# Patient Record
Sex: MALE | Race: WHITE
[De-identification: names, ages, dates, MRNs, and addresses within clinical notes are randomized per-mention and may not be internally consistent; named-entity substitution may affect disease eponyms.]

---

## 2018-10-22 ENCOUNTER — HOSPITAL ENCOUNTER (EMERGENCY)
Dept: HOSPITAL 97 - ER | Age: 28
Discharge: HOME | End: 2018-10-22
Payer: SELF-PAY

## 2018-10-22 DIAGNOSIS — R31.9: Primary | ICD-10-CM

## 2018-10-22 DIAGNOSIS — Z88.0: ICD-10-CM

## 2018-10-22 DIAGNOSIS — Z88.1: ICD-10-CM

## 2018-10-22 DIAGNOSIS — Z72.0: ICD-10-CM

## 2018-10-22 LAB — UA COMPLETE W REFLEX CULTURE PNL UR: (no result)

## 2018-10-22 PROCEDURE — 76770 US EXAM ABDO BACK WALL COMP: CPT

## 2018-10-22 PROCEDURE — 81015 MICROSCOPIC EXAM OF URINE: CPT

## 2018-10-22 PROCEDURE — 87086 URINE CULTURE/COLONY COUNT: CPT

## 2018-10-22 PROCEDURE — 99283 EMERGENCY DEPT VISIT LOW MDM: CPT

## 2018-10-22 PROCEDURE — 81003 URINALYSIS AUTO W/O SCOPE: CPT

## 2018-10-22 PROCEDURE — 87088 URINE BACTERIA CULTURE: CPT

## 2018-10-22 NOTE — EDPHYS
Physician Documentation                                                                           

 [facility]                                                                

Name: Lico Epstein                                                                             

Age: 28 yrs                                                                                       

Sex: Male                                                                                         

: 1990                                                                                   

MRN: [id number]                                                                                   

Arrival Date: 10/22/2018                                                                          

Time: 10:53                                                                                       

Account#: [id number]                                                                            

Bed 6                                                                                             

Private MD:                                                                                       

ED Physician Kentrell Paredes                                                                      

HPI:                                                                                              

10/22                                                                                             

11:33 This 28 yrs old  Male presents to ER via Ambulatory with complaints of BLOOD   snw 

      IN URINE.                                                                                   

11:33 Associated signs and symptoms: Pertinent negatives: abdominal pain, dysuria, fever.     snw 

      Modifying factors: The patient symptoms are alleviated by nothing. The patient has not      

      experienced similar symptoms in the past. last kidney stone 4 months ago, no pain           

      since, no unprotected intercourse.                                                          

                                                                                                  

Historical:                                                                                       

- Allergies:                                                                                      

11:05 PENICILLINS;                                                                            aa5 

11:05 Amoxicillin;                                                                            aa5 

- Home Meds:                                                                                      

11:05 None [Active];                                                                          aa5 

- PMHx:                                                                                           

11:05 Kidney stones;                                                                          aa5 

- PSHx:                                                                                           

11:05 Tonsillectomy; Adenoids;                                                                aa5 

                                                                                                  

- Immunization history:: Adult Immunizations unknown.                                             

- Social history:: Smoking status: Patient uses tobacco products, denies chronic                  

  smoking, but will smoke occasionally.                                                           

- Ebola Screening: : No symptoms or risks identified at this time.                                

                                                                                                  

                                                                                                  

ROS:                                                                                              

11:33 Constitutional: Negative for fever, chills, and weight loss, Eyes: Negative for injury, snw 

      pain, redness, and discharge, ENT: Negative for injury, pain, and discharge, Neck:          

      Negative for injury, pain, and swelling, Cardiovascular: Negative for chest pain,           

      palpitations, and edema, Respiratory: Negative for shortness of breath, cough,              

      wheezing, and pleuritic chest pain, Abdomen/GI: Negative for abdominal pain, nausea,        

      vomiting, diarrhea, and constipation, Back: Negative for injury and pain, MS/Extremity:     

      Negative for injury and deformity, Skin: Negative for injury, rash, and discoloration,      

      Neuro: Negative for headache, weakness, numbness, tingling, and seizure, Psych:             

      Negative for depression, anxiety, suicide ideation, homicidal ideation, and                 

      hallucinations.                                                                             

11:33 : Positive for hematuria, Negative for injury or acute deformity, urinary symptoms,       

      penile pain, testicular pain                                                                

                                                                                                  

Exam:                                                                                             

11:33 Constitutional:  This is a well developed, well nourished patient who is awake, alert,  snw 

      and in no acute distress. Head/Face:  Normocephalic, atraumatic. Eyes:  Pupils equal        

      round and reactive to light, extra-ocular motions intact.  Lids and lashes normal.          

      Conjunctiva and sclera are non-icteric and not injected.  Cornea within normal limits.      

      Periorbital areas with no swelling, redness, or edema. ENT:  Nares patent. No nasal         

      discharge, no septal abnormalities noted.  Tympanic membranes are normal and external       

      auditory canals are clear.  Oropharynx with no redness, swelling, or masses, exudates,      

      or evidence of obstruction, uvula midline.  Mucous membranes moist. Neck:  Trachea          

      midline, no thyromegaly or masses palpated, and no cervical lymphadenopathy.  Supple,       

      full range of motion without nuchal rigidity, or vertebral point tenderness.  No            

      Meningismus. Chest/axilla:  Normal chest wall appearance and motion.  Nontender with no     

      deformity.  No lesions are appreciated. Cardiovascular:  Regular rate and rhythm with a     

      normal S1 and S2.  No gallops, murmurs, or rubs.  Normal PMI, no JVD.  No pulse             

      deficits. Respiratory:  Lungs have equal breath sounds bilaterally, clear to                

      auscultation and percussion.  No rales, rhonchi or wheezes noted.  No increased work of     

      breathing, no retractions or nasal flaring. Abdomen/GI:  Soft, non-tender, with normal      

      bowel sounds.  No distension or tympany.  No guarding or rebound.  No evidence of           

      tenderness throughout. Back:  No spinal tenderness.  No costovertebral tenderness.          

      Full range of motion. Skin:  Warm, dry with normal turgor.  Normal color with no            

      rashes, no lesions, and no evidence of cellulitis. MS/ Extremity:  Pulses equal, no         

      cyanosis.  Neurovascular intact.  Full, normal range of motion. Neuro:  Awake and           

      alert, GCS 15, oriented to person, place, time, and situation.  Cranial nerves II-XII       

      grossly intact.  Motor strength 5/5 in all extremities.  Sensory grossly intact.            

      Cerebellar exam normal.  Normal gait. Psych:  Awake, alert, with orientation to person,     

      place and time.  Behavior, mood, and affect are within normal limits.                       

                                                                                                  

Vital Signs:                                                                                      

11:05  / 98; Pulse 80; Resp 16 S; Temp 98.4(O); Pulse Ox 100% on R/A; Weight 103.42 kg  aa5 

      (R); Height 6 ft. 4 in. (193.04 cm) (R); Pain 0/10;                                         

11:05 Body Mass Index 27.75 (103.42 kg, 193.04 cm)                                            aa5 

                                                                                                  

MDM:                                                                                              

11:12 Patient medically screened.                                                             [facility] 

11:54 Data reviewed: vital signs, nurses notes. Data interpreted: Pulse oximetry: on room air snw 

      is 100 %. Interpretation: normal. Counseling: I had a detailed discussion with the          

      patient and/or guardian regarding: the historical points, exam findings, and any            

      diagnostic results supporting the discharge/admit diagnosis, the presence of at least       

      one elevated blood pressure reading (>120/80) during this emergency department visit,       

      radiology results, the need for outpatient follow up, to return to the emergency            

      department if symptoms worsen or persist or if there are any questions or concerns that     

      arise at home. Special discussion: Based on the history and exam findings, there is no      

      indication for further emergent testing or inpatient evaluation. I discussed with the       

      patient/guardian the need to see the primary care provider for further evaluation of        

      the symptoms. I discussed with the patient/guardian the need to see the urologist for       

      further evaluation of the symptoms.                                                         

                                                                                                  

10/22                                                                                             

10:57 Order name: Urine Microscopic Only; Complete Time: 12:11                                snw 

10/22                                                                                             

11:24 Order name: Urine Dipstick--Ancillary (enter results); Complete Time: 11:59             bd  

10/22                                                                                             

11:14 Order name: US Rp Exam Complete                                                         snw 

10/22                                                                                             

11:55 Order name: Urine Culture                                                               snw 

10/22                                                                                             

10:57 Order name: Urine Dipstick-Ancillary (obtain specimen); Complete Time: 11:25            snw 

                                                                                                  

Administered Medications:                                                                         

12:14 Drug: Macrobid 100 mg Route: PO;                                                        iw  

                                                                                                  

                                                                                                  

Disposition:                                                                                      

17:54 Co-signature as Attending Physician, Kentrell Paredes MD I agree with the assessment and  [facility] 

      plan of care.                                                                               

                                                                                                  

Disposition:                                                                                      

10/22/18 11:54 Discharged to Home. Impression: Hematuria, unspecified.                            

- Condition is Stable.                                                                            

- Discharge Instructions: Hematuria, Adult, Hypertension.                                         

- Prescriptions for Macrobid 100 mg Oral Capsule - take 1 capsule by ORAL route every             

  12 hours for 7 days; 14 capsule.                                                                

- Work release form, Medication Reconciliation Form, Thank You Letter, Antibiotic                 

  Education, Prescription Opioid Use form.                                                        

- Follow up: Sujatha Diana MD; When: 1 week; Reason: Recheck today's complaints,               

  Continuance of care.                                                                            

                                                                                                  

                                                                                                  

                                                                                                  

Signatures:                                                                                       

Dispatcher [employer]                           Kentrell Otto MD MD cha Therrien, Shelly, FNP-C                 FNP-Csnw                                                  

Cris Conway, NIKKIE                     RN   iw                                                   

Joy Quiles RN                     RN   aa5                                                  

                                                                                                  

Corrections: (The following items were deleted from the chart)                                    

11:12 10:57 Urine Pregnancy Test ordered. arabella                                                 snruth 

12:18 11:54 10/22/2018 11:54 Discharged to Home. Impression: Hematuria, unspecified.          iw  

      Condition is Stable. Forms are Medication Reconciliation Form, Thank You Letter,            

      Antibiotic Education, Prescription Opioid Use. Follow up: Sujatha Diana; When: 1 week;     

      Reason: Recheck today's complaints, Continuance of care. snw                                

                                                                                                  

**************************************************************************************************

## 2018-10-22 NOTE — RAD REPORT
EXAM DESCRIPTION:  US - Renal Ultrasound-Complete - 10/22/2018 11:54 am

 

CLINICAL HISTORY:  . Hematuria

 

COMPARISON:  None.

 

FINDINGS:  The right kidney measures 10  cm with a normal echotexture.

 

The left kidney measures 12 cm with a normal echotexture.

 

Hydronephrosis is not seen.

 

 

IMPRESSION:  Unremarkable renal ultrasound.

## 2018-10-22 NOTE — ER
Nurse's Notes                                                                                     

 [facility]                                                                

Name: Lico Epstein                                                                             

Age: 28 yrs                                                                                       

Sex: Male                                                                                         

: 1990                                                                                   

MRN: [id number]                                                                                   

Arrival Date: 10/22/2018                                                                          

Time: 10:53                                                                                       

Account#: [id number]                                                                            

Bed 6                                                                                             

Private MD:                                                                                       

Diagnosis: Hematuria, unspecified                                                                 

                                                                                                  

Presentation:                                                                                     

10/22                                                                                             

11:04 Presenting complaint: Patient states: "I have blood clots and blood in my urine today". aa5 

      Pt denies flank pain, denies pain with urination. Transition of care: patient was not       

      received from another setting of care. Onset of symptoms was 2018. Risk         

      Assessment: Do you want to hurt yourself or someone else? Patient reports no desire to      

      harm self or others. Initial Sepsis Screen: Does the patient meet any 2 criteria? No.       

      Patient's initial sepsis screen is negative. Does the patient have a suspected source       

      of infection? No. Patient's initial sepsis screen is negative. Care prior to arrival:       

      None.                                                                                       

11:04 Method Of Arrival: Ambulatory                                                           aa5 

11:04 Acuity: JYOTI 3                                                                           aa5 

                                                                                                  

Historical:                                                                                       

- Allergies:                                                                                      

11:05 PENICILLINS;                                                                            aa5 

11:05 Amoxicillin;                                                                            aa5 

- Home Meds:                                                                                      

11:05 None [Active];                                                                          aa5 

- PMHx:                                                                                           

11:05 Kidney stones;                                                                          aa5 

- PSHx:                                                                                           

11:05 Tonsillectomy; Adenoids;                                                                aa5 

                                                                                                  

- Immunization history:: Adult Immunizations unknown.                                             

- Social history:: Smoking status: Patient uses tobacco products, denies chronic                  

  smoking, but will smoke occasionally.                                                           

- Ebola Screening: : No symptoms or risks identified at this time.                                

                                                                                                  

                                                                                                  

Screenin:26 Abuse screen: Denies threats or abuse. Denies injuries from another. Nutritional        sg  

      screening: No deficits noted. Tuberculosis screening: No symptoms or risk factors           

      identified. Never had TB. Fall Risk None identified.                                        

                                                                                                  

Assessment:                                                                                       

11:26 General: Appears in no apparent distress. comfortable, well groomed, well developed,    sg  

      well nourished, Behavior is calm, cooperative, appropriate for age. Pain: Denies pain.      

      Neuro: Level of Consciousness is awake, alert, obeys commands, Oriented to person,          

      place, time, situation,  are equal bilaterally Moves all extremities. Full             

      function Gait is steady, Speech is normal, Facial symmetry appears normal.                  

      Cardiovascular: Patient's skin is warm and dry. Chest pain is denied. Respiratory:          

      Respiratory effort is even, unlabored, Respiratory pattern is regular, symmetrical. GI:     

      No signs and/or symptoms were reported involving the gastrointestinal system. : Urine     

      is clear. EENT: No signs and/or symptoms were reported regarding the EENT system. Derm:     

      Skin is pink, warm \T\ dry. Musculoskeletal: No signs and/or symptoms reported regarding    

      the musculoskeletal system.                                                                 

11:50 Reassessment: Patient appears in no apparent distress at this time. ultrasound at         

      bedside at this time.                                                                       

                                                                                                  

Vital Signs:                                                                                      

11:05  / 98; Pulse 80; Resp 16 S; Temp 98.4(O); Pulse Ox 100% on R/A; Weight 103.42 kg  aa5 

      (R); Height 6 ft. 4 in. (193.04 cm) (R); Pain 0/10;                                         

11:05 Body Mass Index 27.75 (103.42 kg, 193.04 cm)                                            aa5 

                                                                                                  

ED Course:                                                                                        

10:53 Patient arrived in ED.                                                                  rg4 

10:56 [facility]                                                        snw 

10:56 Kentrell Paredes MD is Attending Physician.                                             snw 

11:04 Triage completed.                                                                       aa5 

11:04 Arm band placed on.                                                                     aa5 

11:25 Alber Richardson, RN is Primary Nurse.                                                       sg  

11:26 Urine collected: clean catch specimen, clear, Amount Voided: 200mL.                     sg  

11:53 Sujatha Diana MD is Referral Physician.                                              snw 

11:54 Ultrasound completed. Patient tolerated well.                                           lc3 

11:54 US Rp Exam Complete In Process Unspecified.                                             EDMS

                                                                                                  

Administered Medications:                                                                         

12:14 Drug: Macrobid 100 mg Route: PO;                                                        iw  

                                                                                                  

                                                                                                  

Outcome:                                                                                          

11:54 Discharge ordered by MD.                                                                snw 

12:18 Patient left the ED.                                                                    iw  

                                                                                                  

Signatures:                                                                                       

Dispatcher [employer]                           EDMS                                                 

Alber Richardson RN                         RN                                                      

Ani Solano FNP-C                 FNP-Csnw                                                  

Cris Conway RN RN                                                      

Joy Quiles RN                     RN   aa5                                                  

Linda Fitzpatrick Rubi                                 rg4                                                  

                                                                                                  

Corrections: (The following items were deleted from the chart)                                    

11:51 11:50 Reassessment: Patient appears in no apparent distress at this time. xray at         

      bedside at this time sg                                                                     

                                                                                                  

**************************************************************************************************